# Patient Record
Sex: FEMALE | Race: WHITE
[De-identification: names, ages, dates, MRNs, and addresses within clinical notes are randomized per-mention and may not be internally consistent; named-entity substitution may affect disease eponyms.]

---

## 2023-02-10 ENCOUNTER — HOSPITAL ENCOUNTER (EMERGENCY)
Dept: HOSPITAL 76 - ED | Age: 68
Discharge: HOME | End: 2023-02-10
Payer: MEDICARE

## 2023-02-10 VITALS — SYSTOLIC BLOOD PRESSURE: 127 MMHG | DIASTOLIC BLOOD PRESSURE: 69 MMHG

## 2023-02-10 DIAGNOSIS — J06.9: Primary | ICD-10-CM

## 2023-02-10 DIAGNOSIS — I10: ICD-10-CM

## 2023-02-10 DIAGNOSIS — Z20.822: ICD-10-CM

## 2023-02-10 DIAGNOSIS — J45.909: ICD-10-CM

## 2023-02-10 LAB
B PARAPERT DNA SPEC QL NAA+PROBE: NOT DETECTED
B PERT DNA SPEC QL NAA+PROBE: NOT DETECTED
C PNEUM DNA NPH QL NAA+NON-PROBE: NOT DETECTED
FLUAV RNA RESP QL NAA+PROBE: NOT DETECTED
HAEM INFLU B DNA SPEC QL NAA+PROBE: NOT DETECTED
HCOV 229E RNA SPEC QL NAA+PROBE: NOT DETECTED
HCOV HKU1 RNA UPPER RESP QL NAA+PROBE: NOT DETECTED
HCOV NL63 RNA ASPIRATE QL NAA+PROBE: NOT DETECTED
HCOV OC43 RNA SPEC QL NAA+PROBE: NOT DETECTED
HMPV AG SPEC QL: NOT DETECTED
HPIV1 RNA NPH QL NAA+PROBE: NOT DETECTED
HPIV2 SPEC QL CULT: NOT DETECTED
HPIV3 AB TITR SER CF: NOT DETECTED {TITER}
HPIV4 RNA SPEC QL NAA+PROBE: NOT DETECTED
M PNEUMO DNA SPEC QL NAA+PROBE: NOT DETECTED
RSV RNA RESP QL NAA+PROBE: NOT DETECTED
RV+EV RNA SPEC QL NAA+PROBE: DETECTED
SARS-COV-2 RNA PNL SPEC NAA+PROBE: NOT DETECTED

## 2023-02-10 PROCEDURE — 71046 X-RAY EXAM CHEST 2 VIEWS: CPT

## 2023-02-10 PROCEDURE — 87633 RESP VIRUS 12-25 TARGETS: CPT

## 2023-02-10 PROCEDURE — 99284 EMERGENCY DEPT VISIT MOD MDM: CPT

## 2023-02-10 PROCEDURE — 94664 DEMO&/EVAL PT USE INHALER: CPT

## 2023-02-10 PROCEDURE — 94640 AIRWAY INHALATION TREATMENT: CPT

## 2023-02-10 PROCEDURE — 99283 EMERGENCY DEPT VISIT LOW MDM: CPT

## 2023-02-10 NOTE — XRAY REPORT
PROCEDURE:  Chest 2 View X-Ray

 

INDICATIONS:  Productive cough with SOA.

 

TECHNIQUE:  2 views of the chest were acquired.  

 

COMPARISON:  None.

 

FINDINGS:  

 

Surgical changes and devices:  None.  

 

Lungs and pleura:  No pleural effusions or pneumothorax.  Lungs are clear.  

 

Mediastinum:  Mediastinal contours are normal.  Heart size is normal.  

 

Bones and chest wall:  No suspicious bony abnormalities.  Soft tissues appear unremarkable.  

 

IMPRESSION:  

No acute radiographic abnormality.

 

Reviewed by: Bradly Sykes MD on 2/10/2023 1:19 PM PST

Approved by: Bradly Sykes MD on 2/10/2023 1:19 PM [facility]

 

 

Station ID:  [id number]

## 2023-02-10 NOTE — ED PHYSICIAN DOCUMENTATION
History of Present Illness





- Stated complaint


Stated Complaint: COUGH





- Chief complaint


Chief Complaint: Resp





- History obtained from


History obtained from: Patient





- History of Present Illness


Timing: How many days ago (10)





- Additonal information


Additional information: 


67-year-old female presents the emergency department for cough x10 days.  She 

was seen recently at [facility] for same.  She states she was placed on a 

prednisone taper x10 days.  Also has an albuterol inhaler.  She states that she 

does not feel significantly short of breath, but is having continued coughing.  

She states they did a COVID test that was negative.  No fevers.  No chills.  She

is using the albuterol with a spacer.  She states she is having difficulty 

sleeping due to the coughing.





Review of Systems


Constitutional: denies: Fever, Chills


Nose: reports: Rhinorrhea / runny nose


Throat: reports: Sore throat


Respiratory: reports: Cough.  denies: Dyspnea, Wheezing


GI: denies: Abdominal Pain, Nausea, Vomiting, Diarrhea


Skin: denies: Rash


Musculoskeletal: denies: Neck pain, Back pain


Neurologic: denies: Headache





PD PAST MEDICAL HISTORY





- Past Medical History


Past Medical History: Yes


Cardiovascular: Hypertension


Respiratory: Asthma


Endocrine/Autoimmune: HyPOthyroidism





- Past Surgical History


Past Surgical History: No





- Present Medications


Home Medications: 


                                Ambulatory Orders











 Medication  Instructions  Recorded  Confirmed


 


Benzonatate [Tessalon] 200 mg PO TID PRN #60 cap 02/10/23 


 


Codeine Phosphate/Guaifenesin 10 ml PO Q6H PRN #240 ml 02/10/23 





[Guaifen-Codeine 200-20 mg/10Ml]   














- Allergies


Allergies/Adverse Reactions: 


                                    Allergies











Allergy/AdvReac Type Severity Reaction Status Date / Time


 


Sulfa (Sulfonamide Allergy  Hives Verified 02/10/23 12:14





Antibiotics)     














- Social History


Does the pt smoke?: No


Smoking Status: Never smoker


Does the pt drink ETOH?: Yes


ETOH Use: Wine


Does the pt have substance abuse?: No





- POLST


Patient has POLST: No





PD ED PE NORMAL





- Vitals


Vital signs reviewed: Yes





- General


General: Alert and oriented X 3, No acute distress





- HEENT


HEENT: PERRL, Moist mucous membranes





- Neck


Neck: Supple, no meningeal sign





- Cardiac


Cardiac: RRR, Strong equal pulses





- Respiratory


Respiratory: No respiratory distress, Clear bilaterally





- Abdomen


Abdomen: Soft, Non tender, Non distended





- Derm


Derm: Warm and dry





- Extremities


Extremities: No edema





- Neuro


Neuro: Alert and oriented X 3





- Psych


Psych: Normal mood, Normal affect





Results





- Vitals


Vitals: 


                               Vital Signs - 24 hr











  02/10/23 02/10/23





  12:09 13:34


 


Temperature 36.1 C L 


 


Heart Rate 64 70


 


Respiratory 20 24





Rate  


 


Blood Pressure 141/80 H 


 


O2 Saturation 97 








                                     Oxygen











O2 Source                      Room air

















- Rads (name of study)


  ** cxr


Radiology: Final report received, See rad report





PD Medical Decision Making





- ED course


Complexity details: reviewed results, re-evaluated patient, considered 

differential, d/w patient, d/w family


ED course: 





67-year-old female with what appears to be a viral upper respiratory infection. 

She is very well-appearing, nontoxic.  Afebrile.  No hypoxia.  No respiratory  

distress.  Feels better after Guaifenesin with codeine as well as a DuoNeb 

treatment.  No indication for antibiotics. A respiratory PCR was sent.  Patient 

counseled regarding signs and symptoms for which I believe and urgent re-

evaluation would be necessary. Patient with good understanding of and agreement 

to plan and is comfortable going home at this time





This document was made in part using voice recognition software. While efforts 

are made to proofread this document, sound alike and grammatical errors may 

occur.





Departure





- Departure


Disposition: 01 Home, Self Care


Clinical Impression: 


 Viral URI





Condition: Good


Instructions:  ED URI Viral


Follow-Up: 


your,doctor in 1 week [Other]


Prescriptions: 


Codeine Phosphate/Guaifenesin [Guaifen-Codeine 200-20 mg/10Ml] 10 ml PO Q6H PRN 

#240 ml


 PRN Reason: Cough


Benzonatate [Tessalon] 200 mg PO TID PRN #60 cap


 PRN Reason: Cough


Comments: 


Your prescription was sent to [facility] in [place].  Your respiratory panel will

 be back later today, you can check the results of this on your patient portal. 

 We have sent 2 different types of cough medication to the pharmacy for you.  

You can use both of these together.  Your chest x-ray does not show any evidence

 of pneumonia.  Please return if you worsen.  Please continue your current 

medications at home.

## 2024-09-17 ENCOUNTER — HOSPITAL ENCOUNTER (EMERGENCY)
Dept: HOSPITAL 76 - ED | Age: 69
Discharge: HOME | End: 2024-09-17
Payer: MEDICARE

## 2024-09-17 ENCOUNTER — HOSPITAL ENCOUNTER (OUTPATIENT)
Dept: HOSPITAL 76 - EMS | Age: 69
Discharge: LEFT BEFORE DISCHARGE | End: 2024-09-17
Payer: MEDICARE

## 2024-09-17 VITALS — DIASTOLIC BLOOD PRESSURE: 86 MMHG | SYSTOLIC BLOOD PRESSURE: 140 MMHG

## 2024-09-17 VITALS — OXYGEN SATURATION: 100 %
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
## 2024-09-17 DIAGNOSIS — I10: ICD-10-CM

## 2024-09-17 DIAGNOSIS — S49.91XA: Primary | ICD-10-CM
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
## 2024-09-17 DIAGNOSIS — E03.9: ICD-10-CM

## 2024-09-17 DIAGNOSIS — S42.291A: Primary | ICD-10-CM
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
## 2024-09-17 DIAGNOSIS — J45.909: ICD-10-CM

## 2024-09-17 PROCEDURE — 99283 EMERGENCY DEPT VISIT LOW MDM: CPT

## 2024-09-17 PROCEDURE — 96372 THER/PROPH/DIAG INJ SC/IM: CPT

## 2024-09-17 PROCEDURE — 99284 EMERGENCY DEPT VISIT MOD MDM: CPT

## 2024-09-17 PROCEDURE — 73030 X-RAY EXAM OF SHOULDER: CPT

## 2024-09-17 RX ADMIN — HYDROCODONE BITARTRATE AND ACETAMINOPHEN STA TAB: 5; 325 TABLET ORAL at 15:58

## 2024-09-17 RX ADMIN — ACETAMINOPHEN STA MG: 325 TABLET ORAL at 15:24

## 2024-09-17 RX ADMIN — KETOROLAC TROMETHAMINE STA MG: 30 INJECTION, SOLUTION INTRAMUSCULAR at 15:25

## 2024-09-17 NOTE — XRAY REPORT
PROCEDURE:  Shoulder 2+V RT

 

INDICATIONS:  fall

 

TECHNIQUE:  3 views of the shoulder were acquired.  

 

COMPARISON:  None.

 

FINDINGS:  

 

Bones:  Acute comminuted and impacted fracture involving proximal left humeral shaft is seen with fra
cture line extending to involve lesser and greater tuberosities and laterally displaced greater tuber
osity fragment. There is medial displacement of proximal humeral shaft. No suspicious bony lesions.  
Visualized ribs appear intact.  

 

Soft tissues:  No suspicious soft tissue calcifications.  The visualized lungs are within normal limi
ts.  

 

 

IMPRESSION:  

 

Acute comminuted, impacted and displaced right proximal humeral fracture as above.

 

Reviewed by: Jovani Burr MD on 9/17/2024 3:39 PM PDT

Approved by: Jovani Burr MD on 9/17/2024 3:39 PM PDT

 

 

Station ID:  [id number]

## 2024-09-17 NOTE — ED PHYSICIAN DOCUMENTATION
History of Present Illness





- Stated complaint


Stated Complaint: GLF





- Chief complaint


Chief Complaint: Trauma Ext





- History obtained from


History obtained from: Patient





- Additonal information


Additional information: 





This is a 69-year-old female who was out walking her dog today when her dog 

lunged at rabbit taking the patient forward and she fell face forward on 

outstretched right arm.  She presents now with right upper arm and shoulder 

pain.  She states she did not hit her head, had no loss of consciousness, denies

any other injuries in the fall.  Denies any pain to the wrist hands forearms el

bows, pain is directly in the right upper arm and shoulder.  She is not 

attempted any treatment prior to arrival.





PD PAST MEDICAL HISTORY





- Past Medical History


Past Medical History: Yes


Cardiovascular: Hypertension


Respiratory: Asthma


Endocrine/Autoimmune: HyPOthyroidism





- Past Surgical History


Past Surgical History: No





- Present Medications


Home Medications: 


                                Ambulatory Orders











 Medication  Instructions  Recorded  Confirmed


 


Benzonatate [Tessalon] 200 mg PO TID PRN #60 cap 02/10/23 


 


Codeine Phosphate/Guaifenesin 10 ml PO Q6H PRN #240 ml 02/10/23 





[Guaifen-Codeine 200-20 mg/10Ml]   


 


HYDROcod/ACETAM 5/325 [Norco 5/325] 1 - 2 tablet PO Q6H PRN #12 tablet 09/17/24 


 


ONDANSETRON ODT Prepack 2 [ZOFRAN 4 mg TL Q6H #10 tablet 09/17/24 





ODT Prepack 2]   














- Allergies


Allergies/Adverse Reactions: 


                                    Allergies











Allergy/AdvReac Type Severity Reaction Status Date / Time


 


Sulfa (Sulfonamide Allergy  Hives Verified 09/17/24 15:00





Antibiotics)     














- Social History


Does the pt smoke?: No


Smoking Status: Never smoker


Does the pt drink ETOH?: Yes


Does the pt have substance abuse?: No





- POLST


Patient has POLST: No





PD ED PE NORMAL





- Vitals


Vital signs reviewed: Yes





- General


General: Alert and oriented X 3, No acute distress, Well developed/nourished





- HEENT


HEENT: Atraumatic, PERRL, EOMI, Moist mucous membranes





- Cardiac


Cardiac: RRR, No murmur





- Respiratory


Respiratory: No respiratory distress, Clear bilaterally





- Derm


Derm: Normal color, Warm and dry





- Extremities


Extremities: Other (No obvious deformity, tenderness with palpation of the right

upper arm and anterior right shoulder no obvious dislocation.  No pain of the 

right clavicle or scapula, no pain in the right elbow forearm wrist.  2+ radial 

pulses with brisk cap refill)





Results





- Vitals


Vitals: 


                               Vital Signs - 24 hr











  09/17/24





  14:58


 


Temperature 36.6 C


 


Heart Rate 60


 


Respiratory 12





Rate 


 


Blood Pressure 167/88 H


 


O2 Saturation 100








                                     Oxygen











O2 Source                      Room air

















- Rads (name of study)


  ** No standard instances


Relevant Findings:: Final report received





PD Medical Decision Making





- ED course


Complexity details: reviewed results, d/w patient


ED course: 





69-year-old female presented after a fall as described in HPI.  She has pain 

focally in her right upper arm, sustained no other injuries.  X-ray reveals a 

proximal humerus fracture.  I have placed the patient in a sling and advised 

that she will need to follow-up with orthopedic surgery outpatient soon as 

possible.  She was given pain control and advised to continue as needed Tylenol 

or ibuprofen at home with Norco for breakthrough.  I recommended cool compress, 

and I discussed return precautions.  Patient discharged home in stable 

condition.





Departure





- Departure


Disposition: 01 Home, Self Care


Clinical Impression: 


 Proximal humerus fracture





Condition: Good


Instructions:  ED Fx Upper Ext


Follow-Up: 


[facility] [Provider Group]


Prescriptions: 


HYDROcod/ACETAM 5/325 [Norco 5/325] 1 - 2 tablet PO Q6H PRN #12 tablet


 PRN Reason: Pain


ONDANSETRON ODT Prepack 2 [ZOFRAN ODT Prepack 2] 4 mg TL Q6H #10 tablet


Comments: 


You have broken the upper part of your arm where the humerus turns into the 

shoulder.  We have placed you in a sling as we typically do not cast this area 

at least immediately. Please call today or tomorrow to schedule a follow-up with

orthopedic surgery within the next week.  In the meantime, utilize a cool 

compress and listening for positioning and pain control. 





I am prescribing a short course of narcotic pain medication for you. These are 

potentially dangerous and addictive medications that should be used carefully.


These medications may constipate you. Take an over-the-counter stool softener 

(docusate) twice daily with plenty of water while taking these medications. If 

you go 24 hours without a bowel movement, take over-the-counter miralax, per 

package instructions.


Do not drink or drive while taking these medications.


If you received narcotic or sedating medications while in the emergency 

department, do not drive for 24 hours.


Store this medication in a safe, secure place and out of reach of children.


It is a violation of federal law to give or sell this medication to another 

person or to use in a manner other than prescribed.


The ED will not refill narcotic prescriptions, including prescriptions lost or 

stolen.





To dispose of unwanted medications:


1. [facility]'s Office provides a drop box for medication in pill 

form only (no liquids) 8:00 am to 4:30 p.m. Monday-Friday in the lobby of the 

[facility], [address]. Empty pills into ziplock bag 

before disposal. Call [phone] for information.


2.[employer] is a free service available to all [facility] residents. Go 

to [email or web address]





Note that many narcotic pain relievers also contain Tylenol/acetaminophen.  

Please ensure that your total dose of acetaminophen from all sources does not 

exceed 3 g (3000 mg) per day.





Forms:  PCP List